# Patient Record
(demographics unavailable — no encounter records)

---

## 2025-05-29 NOTE — HEALTH RISK ASSESSMENT
[No] : In the past 12 months have you used drugs other than those required for medical reasons? 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

## 2025-05-29 NOTE — PHYSICAL EXAM
[No Acute Distress] : no acute distress [Well-Appearing] : well-appearing [Normal Sclera/Conjunctiva] : normal sclera/conjunctiva [PERRL] : pupils equal round and reactive to light [EOMI] : extraocular movements intact [Normal Outer Ear/Nose] : the outer ears and nose were normal in appearance [Normal Oropharynx] : the oropharynx was normal [Normal TMs] : both tympanic membranes were normal [Normal Nasal Mucosa] : the nasal mucosa was normal [No Lymphadenopathy] : no lymphadenopathy [Supple] : supple [No Respiratory Distress] : no respiratory distress  [No Accessory Muscle Use] : no accessory muscle use [Clear to Auscultation] : lungs were clear to auscultation bilaterally [Normal Rate] : normal rate  [Regular Rhythm] : with a regular rhythm [Normal S1, S2] : normal S1 and S2 [No Edema] : there was no peripheral edema [Soft] : abdomen soft [Non Tender] : non-tender [Non-distended] : non-distended [Normal Bowel Sounds] : normal bowel sounds [Normal Posterior Cervical Nodes] : no posterior cervical lymphadenopathy [Normal Anterior Cervical Nodes] : no anterior cervical lymphadenopathy [No CVA Tenderness] : no CVA  tenderness [No Spinal Tenderness] : no spinal tenderness [No Joint Swelling] : no joint swelling [Grossly Normal Strength/Tone] : grossly normal strength/tone [Coordination Grossly Intact] : coordination grossly intact [Normal Gait] : normal gait [Normal Affect] : the affect was normal [Normal Insight/Judgement] : insight and judgment were intact [de-identified] : +small growths on skin

## 2025-05-29 NOTE — HISTORY OF PRESENT ILLNESS
[FreeTextEntry1] : New CPE [de-identified] : Patient presents today for a CPE. Feels well today  Vaccinations: per pt UTD Flu, covid19 x2 and 1 booster, Tdap -- due  Last Papsmear: per pt was 2 years ago -- reports normal; LMP current -- regular cycle   Last Mammogram: never had   Last Colonoscopy: never had  Due for dental and UTD with eye exams

## 2025-05-29 NOTE — PLAN
[FreeTextEntry1] : 1. CPE - Routine labs ordered - Vaccinations: per pt UTD Flu, covid19 x2 and 1 booster, Tdap -- due (to get at lab) - std testing (blood and urine) - per pt UTD with Papsmear - Breast Cancer Screening: referral for mammogram provided - GI referral provided for colon cancer screening - Depression screen with PHQ2 negative - healthy diet and exercise practices discussed  - Due for dental and UTD with eye exams  2. Skin Growths - appear to be small lipomas - referral for dermatology provided for further evaluation and removal  3. Hx of Migraines - occurs around menstrual cycle -- likely hormonal -- recommended gyn f/u - pt would like neurology evaluation -- referral provided